# Patient Record
Sex: FEMALE | Race: WHITE | NOT HISPANIC OR LATINO | Employment: FULL TIME | URBAN - METROPOLITAN AREA
[De-identification: names, ages, dates, MRNs, and addresses within clinical notes are randomized per-mention and may not be internally consistent; named-entity substitution may affect disease eponyms.]

---

## 2017-04-04 ENCOUNTER — HOSPITAL ENCOUNTER (EMERGENCY)
Facility: MEDICAL CENTER | Age: 32
End: 2017-04-04
Attending: EMERGENCY MEDICINE
Payer: MEDICAID

## 2017-04-04 VITALS
BODY MASS INDEX: 25.07 KG/M2 | HEIGHT: 59 IN | WEIGHT: 124.34 LBS | HEART RATE: 106 BPM | TEMPERATURE: 98.1 F | RESPIRATION RATE: 23 BRPM | OXYGEN SATURATION: 95 % | DIASTOLIC BLOOD PRESSURE: 80 MMHG | SYSTOLIC BLOOD PRESSURE: 116 MMHG

## 2017-04-04 DIAGNOSIS — F10.939 ALCOHOL WITHDRAWAL, WITH UNSPECIFIED COMPLICATION (HCC): ICD-10-CM

## 2017-04-04 LAB
ALBUMIN SERPL BCP-MCNC: 3.9 G/DL (ref 3.2–4.9)
ALBUMIN/GLOB SERPL: 1.3 G/DL
ALP SERPL-CCNC: 86 U/L (ref 30–99)
ALT SERPL-CCNC: 21 U/L (ref 2–50)
AMPHETAMINES UR QL: NEGATIVE
ANION GAP SERPL CALC-SCNC: 8 MMOL/L (ref 0–11.9)
AST SERPL-CCNC: 30 U/L (ref 12–45)
BARBITURATES UR QL SCN: NEGATIVE
BASOPHILS # BLD AUTO: 1.2 % (ref 0–1.8)
BASOPHILS # BLD: 0.06 K/UL (ref 0–0.12)
BENZODIAZ UR QL SCN: POSITIVE
BILIRUB SERPL-MCNC: 0.6 MG/DL (ref 0.1–1.5)
BUN SERPL-MCNC: 6 MG/DL (ref 8–22)
BZE UR QL SCN: NEGATIVE
CALCIUM SERPL-MCNC: 8.3 MG/DL (ref 8.4–10.2)
CHLORIDE SERPL-SCNC: 100 MMOL/L (ref 96–112)
CO2 SERPL-SCNC: 26 MMOL/L (ref 20–33)
CREAT SERPL-MCNC: 0.55 MG/DL (ref 0.5–1.4)
EOSINOPHIL # BLD AUTO: 0.05 K/UL (ref 0–0.51)
EOSINOPHIL NFR BLD: 1 % (ref 0–6.9)
ERYTHROCYTE [DISTWIDTH] IN BLOOD BY AUTOMATED COUNT: 43.7 FL (ref 35.9–50)
ETHANOL BLD-MCNC: 0.18 G/DL
GFR SERPL CREATININE-BSD FRML MDRD: >60 ML/MIN/1.73 M 2
GLOBULIN SER CALC-MCNC: 3.1 G/DL (ref 1.9–3.5)
GLUCOSE SERPL-MCNC: 107 MG/DL (ref 65–99)
HCG SERPL QL: NEGATIVE
HCT VFR BLD AUTO: 37.7 % (ref 37–47)
HGB BLD-MCNC: 12.6 G/DL (ref 12–16)
IMM GRANULOCYTES # BLD AUTO: 0.02 K/UL (ref 0–0.11)
IMM GRANULOCYTES NFR BLD AUTO: 0.4 % (ref 0–0.9)
LYMPHOCYTES # BLD AUTO: 1.66 K/UL (ref 1–4.8)
LYMPHOCYTES NFR BLD: 33.3 % (ref 22–41)
MCH RBC QN AUTO: 29.5 PG (ref 27–33)
MCHC RBC AUTO-ENTMCNC: 33.4 G/DL (ref 33.6–35)
MCV RBC AUTO: 88.3 FL (ref 81.4–97.8)
MONOCYTES # BLD AUTO: 0.37 K/UL (ref 0–0.85)
MONOCYTES NFR BLD AUTO: 7.4 % (ref 0–13.4)
NEUTROPHILS # BLD AUTO: 2.82 K/UL (ref 2–7.15)
NEUTROPHILS NFR BLD: 56.7 % (ref 44–72)
NRBC # BLD AUTO: 0 K/UL
NRBC BLD AUTO-RTO: 0 /100 WBC
PCP UR QL SCN: NEGATIVE
PLATELET # BLD AUTO: 279 K/UL (ref 164–446)
PMV BLD AUTO: 8.8 FL (ref 9–12.9)
POTASSIUM SERPL-SCNC: 4.1 MMOL/L (ref 3.6–5.5)
PROT SERPL-MCNC: 7 G/DL (ref 6–8.2)
RBC # BLD AUTO: 4.27 M/UL (ref 4.2–5.4)
SODIUM SERPL-SCNC: 134 MMOL/L (ref 135–145)
UR OPIATES 2659: NEGATIVE
UR THC 2511T: NEGATIVE
UR TRICYCLIC 2660: NEGATIVE
WBC # BLD AUTO: 5 K/UL (ref 4.8–10.8)

## 2017-04-04 PROCEDURE — 96375 TX/PRO/DX INJ NEW DRUG ADDON: CPT

## 2017-04-04 PROCEDURE — 36415 COLL VENOUS BLD VENIPUNCTURE: CPT

## 2017-04-04 PROCEDURE — 84703 CHORIONIC GONADOTROPIN ASSAY: CPT

## 2017-04-04 PROCEDURE — 80307 DRUG TEST PRSMV CHEM ANLYZR: CPT

## 2017-04-04 PROCEDURE — 80053 COMPREHEN METABOLIC PANEL: CPT

## 2017-04-04 PROCEDURE — 85025 COMPLETE CBC W/AUTO DIFF WBC: CPT

## 2017-04-04 PROCEDURE — 80305 DRUG TEST PRSMV DIR OPT OBS: CPT

## 2017-04-04 PROCEDURE — 700105 HCHG RX REV CODE 258: Performed by: EMERGENCY MEDICINE

## 2017-04-04 PROCEDURE — 700111 HCHG RX REV CODE 636 W/ 250 OVERRIDE (IP): Performed by: EMERGENCY MEDICINE

## 2017-04-04 PROCEDURE — 96374 THER/PROPH/DIAG INJ IV PUSH: CPT

## 2017-04-04 PROCEDURE — 99285 EMERGENCY DEPT VISIT HI MDM: CPT

## 2017-04-04 RX ORDER — SODIUM CHLORIDE 9 MG/ML
1000 INJECTION, SOLUTION INTRAVENOUS ONCE
Status: COMPLETED | OUTPATIENT
Start: 2017-04-04 | End: 2017-04-04

## 2017-04-04 RX ORDER — ESCITALOPRAM OXALATE 20 MG/1
20 TABLET ORAL DAILY
COMMUNITY

## 2017-04-04 RX ORDER — LORAZEPAM 0.5 MG/1
.25-.5 TABLET ORAL EVERY 8 HOURS PRN
COMMUNITY

## 2017-04-04 RX ORDER — LORAZEPAM 2 MG/ML
1 INJECTION INTRAMUSCULAR ONCE
Status: COMPLETED | OUTPATIENT
Start: 2017-04-04 | End: 2017-04-04

## 2017-04-04 RX ORDER — LORAZEPAM 2 MG/ML
2 INJECTION INTRAMUSCULAR ONCE
Status: COMPLETED | OUTPATIENT
Start: 2017-04-04 | End: 2017-04-04

## 2017-04-04 RX ADMIN — LORAZEPAM 1 MG: 2 INJECTION INTRAMUSCULAR at 14:03

## 2017-04-04 RX ADMIN — SODIUM CHLORIDE 1000 ML: 9 INJECTION, SOLUTION INTRAVENOUS at 11:04

## 2017-04-04 RX ADMIN — LORAZEPAM 2 MG: 2 INJECTION INTRAMUSCULAR at 11:04

## 2017-04-04 ASSESSMENT — PAIN SCALES - WONG BAKER: WONGBAKER_NUMERICALRESPONSE: DOESN'T HURT AT ALL
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
## 2017-04-04 NOTE — ED NOTES
Report to Skyler at [facility] per request of Lora  At [phone] prior to pt departure

## 2017-04-04 NOTE — ED NOTES
Assumed care of pt-parents at bedside, placed on cardiac moniter. All aware of plan to include lifeskills eval when BA appropriate

## 2017-04-04 NOTE — ED NOTES
"Pt amb to triage w parents; c/o etoh abuse/withdrawal last drink this am. Pt c/o suicidal ideation; pt states she \"tried to drink herself to death\" x4d ago. Pt has hx etoh abuse >10yrs; hx DT's.  "

## 2017-04-04 NOTE — ED AVS SNAPSHOT
Home Care Instructions                                                                                                                Malou Lara   MRN: [id number]    Department:  [facility], Emergency Dept   Date of Visit:  4/4/2017            [facility], Emergency Dept    [address] [place] [id number]    Phone:  [phone]      You were seen by     Akbar Matos M.D.      Your Diagnosis Was     Alcohol withdrawal, with unspecified complication (CMS-HCC)     F10.239       These are the medications you received during your hospitalization from 04/04/2017 0950 to 04/04/2017 1502     Date/Time Order Dose Route Action    04/04/2017 1104 NS infusion 1,000 mL 1,000 mL Intravenous New Bag    04/04/2017 1104 lorazepam (ATIVAN) injection 2 mg 2 mg Intravenous Given    04/04/2017 1403 lorazepam (ATIVAN) injection 1 mg 1 mg Intravenous Given      Follow-up Information     1. Go to [facility].    Why:  today    Contact information    [address] [place] [address], [place] [address]  [place] [address] [phone]      Medication Information     Review all of your home medications and newly ordered medications with your primary doctor and/or pharmacist as soon as possible. Follow medication instructions as directed by your doctor and/or pharmacist.     Please keep your complete medication list with you and share with your physician. Update the information when medications are discontinued, doses are changed, or new medications (including over-the-counter products) are added; and carry medication information at all times in the event of emergency situations.               Medication List      ASK your doctor about these medications        Instructions    Morning Afternoon Evening Bedtime    LEXAPRO 20 MG tablet   Generic drug:  escitalopram        Take 20 mg by mouth every day.   Dose:  20 mg                        lorazepam 0.5 MG Tabs   Commonly known as:  ATIVAN        Take  0.25-0.5 mg by mouth every 8 hours as needed for Anxiety.   Dose:  0.25-0.5 mg                                Procedures and tests performed during your visit     CBC WITH DIFFERENTIAL    COMP METABOLIC PANEL    DIAGNOSTIC ALCOHOL (BA)    ESTIMATED GFR    HCG QUAL SERUM    UR DRUG SCREEN([place] ONLY)        Discharge Instructions       Alcohol Withdrawal  Alcohol withdrawal is a group of symptoms that can develop when a person who drinks heavily and regularly stops drinking or drinks less.  CAUSES  Heavy and regular drinking can cause chemicals that send signals from the brain to the body (neurotransmitters) to deactivate. Alcohol withdrawal develops when deactivated neurotransmitters reactivate because a person stops drinking or drinks less.  RISK FACTORS  The more a person drinks and the longer he or she drinks, the greater the risk of alcohol withdrawal. Severe withdrawal is more likely to develop in someone who:  · Had severe alcohol withdrawal in the past.  · Had a seizure during a previous episode of alcohol withdrawal.  · Is elderly.  · Is pregnant.  · Has been abusing drugs.  · Has other medical problems, including:  ¨ Infection.  ¨ Heart, lung, or liver disease.  ¨ Seizures.  ¨ Mental health problems.  SYMPTOMS  Symptoms of this condition can be mild to moderate, or they can be severe.  Mild to moderate symptoms may include:  · Fatigue.  · Nightmares.  · Trouble sleeping.  · Depression.  · Anxiety.  · Inability to think clearly.  · Mood swings.  · Irritability.  · Loss of appetite.  · Nausea or vomiting.  · Clammy skin.  · Extreme sweating.  · Rapid heartbeat.  · Shakiness.  · Uncontrollable shaking (tremor).  Severe symptoms may include:  · Fever.  · Seizures.  · Severe confusion.  · Feeling or seeing things that are not there (hallucinations).  Symptoms usually begin within eight hours after a person stops drinking or drinks less. They can last for weeks.  DIAGNOSIS  Alcohol withdrawal is diagnosed  with a medical history and physical exam. Sometimes, urine and blood tests are also done.  TREATMENT  Treatment may involve:  · Monitoring blood pressure, pulse, and breathing.  · Getting fluids through an IV tube.  · Medicine to reduce anxiety.  · Medicine to prevent or control seizures.  · Multivitamins and B vitamins.  · Having a health care provider check on you daily.  If symptoms are moderate to severe or if there is a risk of severe withdrawal, treatment may be done at a hospital or treatment center.  HOME CARE INSTRUCTIONS  · Take medicines and vitamin supplements only as directed by your health care provider.  · Do not drink alcohol.  · Have someone stay with you or be available if you need help.  · Drink enough fluid to keep your urine clear or pale yellow.  · Consider joining a 12-step program or another alcohol support group.  SEEK MEDICAL CARE IF:  · Your symptoms get worse or do not go away.  · You cannot keep food or water in your stomach.  · You are struggling with not drinking alcohol.  · You cannot stop drinking alcohol.  SEEK IMMEDIATE MEDICAL CARE IF:   · You have an irregular heartbeat.  · You have chest pain.  · You have trouble breathing.  · You have symptoms of severe withdrawal, such as:  ¨ A fever.  ¨ Seizures.  ¨ Severe confusion.  ¨ Hallucinations.     This information is not intended to replace advice given to you by your health care provider. Make sure you discuss any questions you have with your health care provider.     Document Released: 09/27/2006 Document Revised: 01/08/2016 Document Reviewed: 10/06/2015  [employer] Interactive Patient Education ©2016 [employer]            Patient Information     Patient Information    Following emergency treatment: all patient requiring follow-up care must return either to a private physician or a clinic if your condition worsens before you are able to obtain further medical attention, please return to the emergency room.     Billing  Information    At [facility], we work to make the billing process streamlined for our patients.  Our Representatives are here to answer any questions you may have regarding your hospital bill.  If you have insurance coverage and have supplied your insurance information to us, we will submit a claim to your insurer on your behalf.  Should you have any questions regarding your bill, we can be reached online or by phone as follows:  Online: You are able pay your bills online or live chat with our representatives about any billing questions you may have. We are here to help Monday - Friday from 8:00am to 7:30pm and 9:00am - 12:00pm on Saturdays.  Please visit [email or web address]  for more information.   Phone:  [phone] or [phone]    Please note that your emergency physician, surgeon, pathologist, radiologist, anesthesiologist, and other specialists are not employed by [facility] and will therefore bill separately for their services.  Please contact them directly for any questions concerning their bills at the numbers below:     Emergency Physician Services:  [phone]  [facility]:  [phone]  [facility]:  [phone]  [facility]:  [phone]    1. Your final bill may vary from the amount quoted upon discharge if all procedures are not complete at that time, or if your doctor has additional procedures of which we are not aware. You will receive an additional bill if you return to the Emergency Department at [facility] for suture removal regardless of the facility of which the sutures were placed.     2. Please arrange for settlement of this account at the emergency registration.    3. All self-pay accounts are due in full at the time of treatment.  If you are unable to meet this obligation then payment is expected within 4-5 days.     4. If you have had radiology studies (CT, X-ray, Ultrasound, MRI), you have  received a preliminary result during your emergency department visit. Please contact the radiology department [phone] to receive a copy of your final result. Please discuss the Final result with your primary physician or with the follow up physician provided.     Crisis Hotline:  [place] Crisis Hotline:  6-330-MUKECXK or 1-177.339.3880  [place] Crisis Hotline:    1-506.144.6705 or 119-048-1895         ED Discharge Follow Up Questions    1. In order to provide you with very good care, we would like to follow up with a phone call in the next few days.  May we have your permission to contact you?     YES /  NO    2. What is the best phone number to call you? (       )_____-__________    3. What is the best time to call you?      Morning  /  Afternoon  /  Evening                   Patient Signature:  ____________________________________________________________    Date:  ____________________________________________________________

## 2017-04-04 NOTE — ED NOTES
Per er charge, [facility] has accepted pt for admit , today at 1330. Update to same. Mom remains at bedside

## 2017-04-04 NOTE — ED NOTES
Pt med with ativan  Per request. Vs as charted, await [facility]

## 2017-04-04 NOTE — ED NOTES
Pt accepted by [facility]-transported to [facility] by Lora (conselor from [facility])

## 2017-04-04 NOTE — ED AVS SNAPSHOT
Nexgate Access Code: Activation code not generated  Current Nexgate Status: Active    Lomographyhart  A secure, online tool to manage your health information     [employer]’s Nexgate® is a secure, online tool that connects you to your personalized health information from the privacy of your home -- day or night - making it very easy for you to manage your healthcare. Once the activation process is completed, you can even access your medical information using the Nexgate brittani, which is available for free in the Apple Brittani store or Google Play store.     Nexgate provides the following levels of access (as shown below):   My Chart Features   [facility] Primary Care Doctor [facility]  Specialists [facility]  Urgent  Care Non-[facility]  Primary Care  Doctor   Email your healthcare team securely and privately 24/7 X X X X   Manage appointments: schedule your next appointment; view details of past/upcoming appointments X      Request prescription refills. X      View recent personal medical records, including lab and immunizations X X X X   View health record, including health history, allergies, medications X X X X   Read reports about your outpatient visits, procedures, consult and ER notes X X X X   See your discharge summary, which is a recap of your hospital and/or ER visit that includes your diagnosis, lab results, and care plan. X X       How to register for Nexgate:  1. Go to  [email or web address].  2. Click on the Sign Up Now box, which takes you to the New Member Sign Up page. You will need to provide the following information:  a. Enter your Nexgate Access Code exactly as it appears at the top of this page. (You will not need to use this code after you’ve completed the sign-up process. If you do not sign up before the expiration date, you must request a new code.)   b. Enter your date of birth.   c. Enter your home email address.   d. Click Submit, and follow the next screen’s instructions.  3. Create a Nexgate ID. This will  be your Lasso login ID and cannot be changed, so think of one that is secure and easy to remember.  4. Create a Lasso password. You can change your password at any time.  5. Enter your Password Reset Question and Answer. This can be used at a later time if you forget your password.   6. Enter your e-mail address. This allows you to receive e-mail notifications when new information is available in Lasso.  7. Click Sign Up. You can now view your health information.    For assistance activating your Lasso account, call [phone]

## 2017-04-04 NOTE — ED AVS SNAPSHOT
4/4/2017          Malou Lara  [address] [place] [address]    Dear Malou:    [facility] wants to ensure your discharge home is safe and you or your loved ones have had all your questions answered regarding your care after you leave the hospital.    You may receive a telephone call within two days of your discharge.  This call is to make certain you understand your discharge instructions as well as ensure we provided you with the best care possible during your stay with us.     The call will only last approximately 3-5 minutes and will be done by a nurse.    Once again, we want to ensure your discharge home is safe and that you have a clear understanding of any next steps in your care.  If you have any questions or concerns, please do not hesitate to contact us, we are here for you.  Thank you for choosing [facility] for your healthcare needs.    Sincerely,    Kevyn Santamaria    [facility]

## 2017-04-04 NOTE — DISCHARGE INSTRUCTIONS
Alcohol Withdrawal  Alcohol withdrawal is a group of symptoms that can develop when a person who drinks heavily and regularly stops drinking or drinks less.  CAUSES  Heavy and regular drinking can cause chemicals that send signals from the brain to the body (neurotransmitters) to deactivate. Alcohol withdrawal develops when deactivated neurotransmitters reactivate because a person stops drinking or drinks less.  RISK FACTORS  The more a person drinks and the longer he or she drinks, the greater the risk of alcohol withdrawal. Severe withdrawal is more likely to develop in someone who:  · Had severe alcohol withdrawal in the past.  · Had a seizure during a previous episode of alcohol withdrawal.  · Is elderly.  · Is pregnant.  · Has been abusing drugs.  · Has other medical problems, including:  ¨ Infection.  ¨ Heart, lung, or liver disease.  ¨ Seizures.  ¨ Mental health problems.  SYMPTOMS  Symptoms of this condition can be mild to moderate, or they can be severe.  Mild to moderate symptoms may include:  · Fatigue.  · Nightmares.  · Trouble sleeping.  · Depression.  · Anxiety.  · Inability to think clearly.  · Mood swings.  · Irritability.  · Loss of appetite.  · Nausea or vomiting.  · Clammy skin.  · Extreme sweating.  · Rapid heartbeat.  · Shakiness.  · Uncontrollable shaking (tremor).  Severe symptoms may include:  · Fever.  · Seizures.  · Severe confusion.  · Feeling or seeing things that are not there (hallucinations).  Symptoms usually begin within eight hours after a person stops drinking or drinks less. They can last for weeks.  DIAGNOSIS  Alcohol withdrawal is diagnosed with a medical history and physical exam. Sometimes, urine and blood tests are also done.  TREATMENT  Treatment may involve:  · Monitoring blood pressure, pulse, and breathing.  · Getting fluids through an IV tube.  · Medicine to reduce anxiety.  · Medicine to prevent or control seizures.  · Multivitamins and B vitamins.  · Having a health  care provider check on you daily.  If symptoms are moderate to severe or if there is a risk of severe withdrawal, treatment may be done at a hospital or treatment center.  HOME CARE INSTRUCTIONS  · Take medicines and vitamin supplements only as directed by your health care provider.  · Do not drink alcohol.  · Have someone stay with you or be available if you need help.  · Drink enough fluid to keep your urine clear or pale yellow.  · Consider joining a 12-step program or another alcohol support group.  SEEK MEDICAL CARE IF:  · Your symptoms get worse or do not go away.  · You cannot keep food or water in your stomach.  · You are struggling with not drinking alcohol.  · You cannot stop drinking alcohol.  SEEK IMMEDIATE MEDICAL CARE IF:   · You have an irregular heartbeat.  · You have chest pain.  · You have trouble breathing.  · You have symptoms of severe withdrawal, such as:  ¨ A fever.  ¨ Seizures.  ¨ Severe confusion.  ¨ Hallucinations.     This information is not intended to replace advice given to you by your health care provider. Make sure you discuss any questions you have with your health care provider.     Document Released: 09/27/2006 Document Revised: 01/08/2016 Document Reviewed: 10/06/2015  [employer] Interactive Patient Education ©2016 [employer]

## 2017-04-24 ENCOUNTER — HOSPITAL ENCOUNTER (OUTPATIENT)
Dept: LAB | Facility: MEDICAL CENTER | Age: 32
End: 2017-04-24
Attending: INTERNAL MEDICINE
Payer: MEDICAID

## 2017-04-24 ENCOUNTER — HOSPITAL ENCOUNTER (OUTPATIENT)
Dept: LAB | Facility: MEDICAL CENTER | Age: 32
End: 2017-04-24
Attending: NURSE PRACTITIONER
Payer: MEDICAID

## 2017-04-24 DIAGNOSIS — R20.2 TINGLING IN EXTREMITIES: ICD-10-CM

## 2017-04-24 LAB
ALBUMIN SERPL BCP-MCNC: 4.4 G/DL (ref 3.2–4.9)
ALBUMIN/GLOB SERPL: 1.6 G/DL
ALP SERPL-CCNC: 59 U/L (ref 30–99)
ALT SERPL-CCNC: 15 U/L (ref 2–50)
ANION GAP SERPL CALC-SCNC: 7 MMOL/L (ref 0–11.9)
AST SERPL-CCNC: 16 U/L (ref 12–45)
BASOPHILS # BLD AUTO: 1 % (ref 0–1.8)
BASOPHILS # BLD: 0.08 K/UL (ref 0–0.12)
BILIRUB SERPL-MCNC: 0.3 MG/DL (ref 0.1–1.5)
BUN SERPL-MCNC: 6 MG/DL (ref 8–22)
CALCIUM SERPL-MCNC: 9.6 MG/DL (ref 8.5–10.5)
CHLORIDE SERPL-SCNC: 105 MMOL/L (ref 96–112)
CHOLEST SERPL-MCNC: 154 MG/DL (ref 100–199)
CO2 SERPL-SCNC: 24 MMOL/L (ref 20–33)
CREAT SERPL-MCNC: 0.57 MG/DL (ref 0.5–1.4)
EOSINOPHIL # BLD AUTO: 0.05 K/UL (ref 0–0.51)
EOSINOPHIL NFR BLD: 0.7 % (ref 0–6.9)
ERYTHROCYTE [DISTWIDTH] IN BLOOD BY AUTOMATED COUNT: 44.4 FL (ref 35.9–50)
GFR SERPL CREATININE-BSD FRML MDRD: >60 ML/MIN/1.73 M 2
GLOBULIN SER CALC-MCNC: 2.7 G/DL (ref 1.9–3.5)
GLUCOSE SERPL-MCNC: 93 MG/DL (ref 65–99)
HCT VFR BLD AUTO: 37.3 % (ref 37–47)
HDLC SERPL-MCNC: 51 MG/DL
HGB BLD-MCNC: 12.2 G/DL (ref 12–16)
IMM GRANULOCYTES # BLD AUTO: 0.02 K/UL (ref 0–0.11)
IMM GRANULOCYTES NFR BLD AUTO: 0.3 % (ref 0–0.9)
LDLC SERPL CALC-MCNC: 93 MG/DL
LYMPHOCYTES # BLD AUTO: 1.68 K/UL (ref 1–4.8)
LYMPHOCYTES NFR BLD: 22 % (ref 22–41)
MCH RBC QN AUTO: 29.8 PG (ref 27–33)
MCHC RBC AUTO-ENTMCNC: 32.7 G/DL (ref 33.6–35)
MCV RBC AUTO: 91.2 FL (ref 81.4–97.8)
MONOCYTES # BLD AUTO: 0.52 K/UL (ref 0–0.85)
MONOCYTES NFR BLD AUTO: 6.8 % (ref 0–13.4)
NEUTROPHILS # BLD AUTO: 5.28 K/UL (ref 2–7.15)
NEUTROPHILS NFR BLD: 69.2 % (ref 44–72)
NRBC # BLD AUTO: 0 K/UL
NRBC BLD AUTO-RTO: 0 /100 WBC
PLATELET # BLD AUTO: 416 K/UL (ref 164–446)
PMV BLD AUTO: 9.1 FL (ref 9–12.9)
POTASSIUM SERPL-SCNC: 4.4 MMOL/L (ref 3.6–5.5)
PROT SERPL-MCNC: 7.1 G/DL (ref 6–8.2)
RBC # BLD AUTO: 4.09 M/UL (ref 4.2–5.4)
SODIUM SERPL-SCNC: 136 MMOL/L (ref 135–145)
TRIGL SERPL-MCNC: 50 MG/DL (ref 0–149)
TSH SERPL DL<=0.005 MIU/L-ACNC: 0.88 UIU/ML (ref 0.3–3.7)
VIT B12 SERPL-MCNC: 93 PG/ML (ref 211–911)
WBC # BLD AUTO: 7.6 K/UL (ref 4.8–10.8)

## 2017-04-24 PROCEDURE — 80061 LIPID PANEL: CPT

## 2017-04-24 PROCEDURE — 85025 COMPLETE CBC W/AUTO DIFF WBC: CPT

## 2017-04-24 PROCEDURE — 82607 VITAMIN B-12: CPT

## 2017-04-24 PROCEDURE — 36415 COLL VENOUS BLD VENIPUNCTURE: CPT

## 2017-04-24 PROCEDURE — 80053 COMPREHEN METABOLIC PANEL: CPT

## 2017-04-24 PROCEDURE — 84443 ASSAY THYROID STIM HORMONE: CPT

## 2017-05-31 ENCOUNTER — NON-PROVIDER VISIT (OUTPATIENT)
Dept: OBGYN | Facility: CLINIC | Age: 32
End: 2017-05-31
Payer: MEDICAID

## 2017-05-31 ENCOUNTER — INITIAL PRENATAL (OUTPATIENT)
Dept: OBGYN | Facility: CLINIC | Age: 32
End: 2017-05-31
Payer: MEDICAID

## 2017-05-31 VITALS
DIASTOLIC BLOOD PRESSURE: 70 MMHG | BODY MASS INDEX: 24.8 KG/M2 | HEIGHT: 59 IN | SYSTOLIC BLOOD PRESSURE: 102 MMHG | WEIGHT: 123 LBS

## 2017-05-31 DIAGNOSIS — Z32.01 PREGNANCY EXAMINATION OR TEST, POSITIVE RESULT: ICD-10-CM

## 2017-05-31 DIAGNOSIS — N93.8 DUB (DYSFUNCTIONAL UTERINE BLEEDING): ICD-10-CM

## 2017-05-31 LAB
IN CLINIC OB SCAN: NORMAL
INT CON NEG: NEGATIVE
INT CON POS: POSITIVE
POC URINE PREGNANCY TEST: POSITIVE

## 2017-05-31 PROCEDURE — 81025 URINE PREGNANCY TEST: CPT | Performed by: OBSTETRICS & GYNECOLOGY

## 2017-05-31 PROCEDURE — 76830 TRANSVAGINAL US NON-OB: CPT | Performed by: OBSTETRICS & GYNECOLOGY

## 2017-05-31 PROCEDURE — 99203 OFFICE O/P NEW LOW 30 MIN: CPT | Mod: 25 | Performed by: OBSTETRICS & GYNECOLOGY

## 2017-05-31 NOTE — PROGRESS NOTES
"CC: Confirmation of Pregnancy    HPI: Pt is a 32 yo  lmp 3/25/17 (approximate)  who presents for evaluation of amenorrhea.  She has had no bleeding since her last menses.  A urine pregnancy test was positive.  She denies vaginal spotting or pain.  She notes nausea and vomiting.      ROS: negative for dizziness, SOB, chest pain, palpitations, dysuria, vaginal discharge.    Blood pressure 102/70, height 1.499 m (4' 11\"), weight 55.792 kg (123 lb), last menstrual period 2017.    GENERAL: Alert, in no apparent distress  PSYCHIATRIC: Appropriate affect, intact insight and judgement.  ABDOMEN: Soft, nontender, nondistended.  No palpable masses. No hepatosplenomegaly.   No rebound or guarding.  No inguinal lymphadenopathy.  BACK: No CVA tenderness  EXTREMITIES: No edema, no calf tenderness.    GENITOURINARY:  Normal external genitalia, no lesions.  Normal urethral meatus, no masses or tenderness.  Normal bladder without fullness or masses.  Vagina well estrogenized, no vaginal discharge or lesions.  Cervix normal length, nontender.  Uterus 10 wks, normal shape and contour, nontender.  Adnexa nontender, no masses.  Normal anus and perineum.      TRANSVAGINAL ULTRASOUND - performed and interpreted by me    Single gestational sac visualized.  Yolk sac visualized.   Gates intrauterine pregnancy with CRL measuring 4.82 cm, c/w 11 +4/7 weeks EGA, positive fetal cardiac activity noted. EDC 17.     No fluid in cul de sac.    Ovaries and cervix appear grossly normal. Cervix measures 3.09 cm.      ASSESSMENT/PLAN:  1. DUB visit - Gates IUP at 11 + 4/7 wks.  Prenatal Vitamins.  F/U for NOB appointment 2 wks.   2. Hx of cone bx age 16 - cervical length normal today.  Recommend cervical length at 16 wks.  3. Anxiety - off Lexapro.  Recommended d/c of Ativan.  Pt has psychiatrist that she follows up with.  "

## 2017-05-31 NOTE — PROGRESS NOTES
today. LMP unknown   On PNV  Phone # [phone]  Pharmacy verified with patient  C/o occasional nausea

## 2017-05-31 NOTE — MR AVS SNAPSHOT
"        Malou Lara   2017 8:30 AM   Initial Prenatal   MRN: [id number]    [facility]   Dept Phone:  [phone]    Description:  Female : 1985   Provider:  Sera Rousseau M.D.           Allergies as of 2017     Allergen Noted Reactions    Pertussis Vaccine 2016   Anaphylaxis    Avocado 2016       Pumpkin Seed 2016         Vital Signs     Blood Pressure Height Weight Body Mass Index Last Menstrual Period Smoking Status    102/70 mmHg 1.499 m (4' 11\") 55.792 kg (123 lb) 24.83 kg/m2 2017 Former Smoker      Basic Information     Date Of Birth Sex Race Ethnicity Preferred Language    1985 Female White Non- English      Problem List              ICD-10-CM Priority Class Noted - Resolved    Clavicular fracture, closed, acromial end S42.033A   3/9/2016 - Present    Cardiomyopathy (CMS-HCC) I42.9   2016 - Present    Tingling in extremities R20.2   2016 - Present      Health Maintenance        Date Due Completion Dates    IMM DTaP/Tdap/Td Vaccine (1 - Tdap) 2004 ---    IMM PNEUMOCOCCAL 19-64 (ADULT) MEDIUM RISK SERIES (1 of 1 - PPSV23) 2004 ---    PAP SMEAR 2006 ---            Current Immunizations     No immunizations on file.      Below and/or attached are the medications your provider expects you to take. Review all of your home medications and newly ordered medications with your provider and/or pharmacist. Follow medication instructions as directed by your provider and/or pharmacist. Please keep your medication list with you and share with your provider. Update the information when medications are discontinued, doses are changed, or new medications (including over-the-counter products) are added; and carry medication information at all times in the event of emergency situations     Allergies:  PERTUSSIS VACCINE - Anaphylaxis     AVOCADO - (reactions not documented)     PUMPKIN SEED - (reactions not documented)            "      Medications  Valid as of: May 31, 2017 -  9:05 AM    Generic Name Brand Name Tablet Size Instructions for use    Escitalopram Oxalate (Tab) LEXAPRO 20 MG Take 20 mg by mouth every day.        LORazepam (Tab) ATIVAN 0.5 MG Take 0.25-0.5 mg by mouth every 8 hours as needed for Anxiety.        Prenatal Vit w/ Fe Bisg-FA   Take  by mouth.        .                 Medicines prescribed today were sent to:     [facility] [address] [place] [address] - [id number] [facility] [place] [address]    Phone: [phone] Fax: [phone]    Open 24 Hours?: No      Medication refill instructions:       If your prescription bottle indicates you have medication refills left, it is not necessary to call your provider’s office. Please contact your pharmacy and they will refill your medication.    If your prescription bottle indicates you do not have any refills left, you may request refills at any time through one of the following ways: The online [employer] system (except Urgent Care), by calling your provider’s office, or by asking your pharmacy to contact your provider’s office with a refill request. Medication refills are processed only during regular business hours and may not be available until the next business day. Your provider may request additional information or to have a follow-up visit with you prior to refilling your medication.   *Please Note: Medication refills are assigned a new Rx number when refilled electronically. Your pharmacy may indicate that no refills were authorized even though a new prescription for the same medication is available at the pharmacy. Please request the medicine by name with the pharmacy before contacting your provider for a refill.           [employer] Access Code: Activation code not generated  Current Go [employer] P[employer] Status: Active

## 2017-07-25 ENCOUNTER — INITIAL PRENATAL (OUTPATIENT)
Dept: OBGYN | Facility: CLINIC | Age: 32
End: 2017-07-25
Payer: MEDICAID

## 2017-07-25 ENCOUNTER — HOSPITAL ENCOUNTER (OUTPATIENT)
Facility: MEDICAL CENTER | Age: 32
End: 2017-07-25
Attending: NURSE PRACTITIONER
Payer: MEDICAID

## 2017-07-25 VITALS — WEIGHT: 127 LBS | SYSTOLIC BLOOD PRESSURE: 106 MMHG | BODY MASS INDEX: 25.64 KG/M2 | DIASTOLIC BLOOD PRESSURE: 62 MMHG

## 2017-07-25 DIAGNOSIS — I42.6 ALCOHOLIC CARDIOMYOPATHY (HCC): ICD-10-CM

## 2017-07-25 DIAGNOSIS — Z34.00 SUPERVISION OF NORMAL FIRST PREGNANCY, ANTEPARTUM: ICD-10-CM

## 2017-07-25 DIAGNOSIS — Z98.890 H/O CONE BIOPSY OF CERVIX: ICD-10-CM

## 2017-07-25 DIAGNOSIS — F10.10: ICD-10-CM

## 2017-07-25 DIAGNOSIS — Z34.82 ENCOUNTER FOR SUPERVISION OF OTHER NORMAL PREGNANCY IN SECOND TRIMESTER: ICD-10-CM

## 2017-07-25 DIAGNOSIS — O99.310: ICD-10-CM

## 2017-07-25 LAB
APPEARANCE UR: NORMAL
BILIRUB UR STRIP-MCNC: NORMAL MG/DL
COLOR UR AUTO: NORMAL
GLUCOSE UR STRIP.AUTO-MCNC: NEGATIVE MG/DL
KETONES UR STRIP.AUTO-MCNC: NEGATIVE MG/DL
LEUKOCYTE ESTERASE UR QL STRIP.AUTO: NEGATIVE
NITRITE UR QL STRIP.AUTO: NEGATIVE
PH UR STRIP.AUTO: 6 [PH] (ref 5–8)
PROT UR QL STRIP: NEGATIVE MG/DL
RBC UR QL AUTO: NEGATIVE
SP GR UR STRIP.AUTO: 1
UROBILINOGEN UR STRIP-MCNC: NORMAL MG/DL

## 2017-07-25 PROCEDURE — 81002 URINALYSIS NONAUTO W/O SCOPE: CPT | Performed by: NURSE PRACTITIONER

## 2017-07-25 PROCEDURE — 59401 PR NEW OB VISIT: CPT | Performed by: NURSE PRACTITIONER

## 2017-07-25 PROCEDURE — 87591 N.GONORRHOEAE DNA AMP PROB: CPT

## 2017-07-25 PROCEDURE — 87491 CHLMYD TRACH DNA AMP PROBE: CPT

## 2017-07-25 NOTE — PROGRESS NOTES
S:  Malou Lara is a 32 y.o.  who presents for her new OB exam.  She is 19w3d with and KATHY of Estimated Date of Delivery: 17 by . Hx of alcohol abuse with withdrawal seizure. Currently in remission. Sees [facility] for psych. Hx of lexapro use, none currently. Very rare use of ativan for anxiety. She has no complaints.  She is currently not working outside the. No heavy lifting or chemical exposure.  FOB is in penitentiary for domestic violence. She states she is safe now and has very supportive family.     desires AFP.  declines CF.  Denies VB, LOF, or cramping.  Denies dysuria, vaginal DC. Reports positive fetal movement.     Pt is single and lives with boyfriend.  Pregnancy is desired.      Past Medical History   Diagnosis Date   • Arthritis      RA   • Pain 3/8/16       right shoulder chest and head  5/10     Family History   Problem Relation Age of Onset   • Cancer Maternal Grandmother      breast      Social History     Social History   • Marital Status:      Spouse Name: N/A   • Number of Children: N/A   • Years of Education: N/A     Occupational History   • Not on file.     Social History Main Topics   • Smoking status: Former Smoker -- 1.00 packs/day     Types: Cigarettes     Quit date: 2016   • Smokeless tobacco: Never Used   • Alcohol Use: No      Comment: etoh abuse   • Drug Use: No   • Sexual Activity:     Partners: Male     Birth Control/ Protection: Pill     Other Topics Concern   • Not on file     Social History Narrative     OB History    Para Term  AB SAB TAB Ectopic Multiple Living   1               # Outcome Date GA Lbr Rajendra/2nd Weight Sex Delivery Anes PTL Lv   1 Current                   History of HSV I or II in self or partner: no  History of Thyroid problems: no    O:    Filed Vitals:    17 0917   BP: 106/62   Weight: 57.607 kg (127 lb)      See Prenatal Physical.    Wet mount: none      A:   1.  IUP @ 19w3d per         2.  S=D         3.  See problem list below        4.  Alcohol abuse in remission        5.  Risk for incompetent cervix       Patient Active Problem List    Diagnosis Date Noted   • Supervision of normal first pregnancy 07/25/2017   • Alcohol abuse affecting pregnancy 07/25/2017   • H/O cone biopsy of cervix 07/25/2017   • Tingling in extremities 09/14/2016   • Cardiomyopathy (CMS-HCC) 08/23/2016   • Clavicular fracture, closed, acromial end 03/09/2016         P:  1.  GC/CT  Done. Pap done at [facility] records requested        2.  Prenatal labs ordered - lab slip given including AFP        3.  Discussed PNV, diet, avoidances and adequate water intake        4.  NOB packet given        5.  Return to office in 4 wks        6.  Complete OB US first available..         7.  Add transvaginal cervical length to ultrasound.         8.  Consider benadryl instead of ativan for prn anxiety.         9.  Continue mental health care with [facility]       No orders of the defined types were placed in this encounter.

## 2017-07-25 NOTE — Clinical Note
Cystic Fibrosis Carrier Testing  Maloujamari Lara    The following information is about a blood test that can be done to determine if you and/or your partner carry the gene for cystic fibrosis.    WHAT IS CYSTIC FIBROSIS?  · Cystic fibrosis (CF) is an inherited disease that affects more than 25,000 American children and young adults.  · Symptoms of CF vary but include lung congestion, pneumonia, diarrhea and poor growth.  Most people with CF have severe medical problems and some die at a young age.  Others have so few symptoms they are unaware they have CF.  · CF does not affect intelligence.  · Although there is no cure for CF at this time, scientists are making progress in improving treatment and in searching for a cure.  In the past many people with CF  at a very young age.  Today, many are living into their 20’s and 30’s.    IS THERE A CHANCE MY BABY COULD HAVE CYSTIC FIBROSIS?  · You can have a child with CF even if there is no history in your family (see chart below).  · CF testing can help determine if you are a carrier and at risk to have a child with CF.  Note: if both parents are carriers, there is a 1 in 4 (25%) chance with each pregnancy that they will have a child with CF.  · Carriers have one normal CF gene and one altered CF gene.  · People with CF have two altered CF genes.  · Most people have two normal copies of the CF gene.    Approximate risk that a couple with no family history of cystic fibrosis will have a child with cystic fibrosis:    Ethnic background / Risk     couple:  1 in 2,500   couple:  1 in 15,000            couple:  1 in 8,000     American couple:  1 in 32,000     WHAT TESTING IS AVAILABLE?  · There is a blood test that can be done to find out if you or your partner is a carrier.  · It is important to understand that CF carrier testing does not detect all CF carriers.  · If the test shows that you are both CF carriers, you unborn baby can  be tested to find out if the baby has CF.    HOW MUCH DOES IT COST TO HAVE CYSTIC FIBROSIS CARRIER TESTING?  · Cost and insurance coverage for CF carrier testing vary depending upon the laboratory used and your insurance policy.  · The average cost for CF carrier testing is $780 per person.  · Your genetic counselor can provide you with more information about cystic fibrosis carrier testing.    _____  Yes, I am interested in discussing carrier testing with a genetic counselor.    _____  No, I am not interested in CF carrier testing or in receiving more information about CF carrier testing.      Client signature: ________________________________________  7/25/2017

## 2017-07-25 NOTE — MR AVS SNAPSHOT
Malou Barton Lara   2017 9:30 AM   Initial Prenatal   MRN: [id number]    [facility]   Dept Phone:  [phone]    Description:  Female : 1985   Provider:  Delilah Almendarez D.N.P.; [facility] INTAKE           Allergies as of 2017     Allergen Noted Reactions    Pertussis Vaccine 2016   Anaphylaxis    Avocado 2016       Pumpkin Seed 2016         You were diagnosed with     Encounter for supervision of other normal pregnancy in second trimester   [2400682]       Supervision of normal first pregnancy, antepartum   [1532569]       Alcoholic cardiomyopathy (CMS-HCC)   [425.5.ICD-9-CM]       Alcohol abuse affecting pregnancy   [9792390]       H/O cone biopsy of cervix   [933042]         Vital Signs     Blood Pressure Weight Last Menstrual Period Smoking Status          106/62 mmHg 57.607 kg (127 lb) 2017 Former Smoker        Basic Information     Date Of Birth Sex Race Ethnicity Preferred Language    1985 Female White Non- English      Your appointments     Aug 28, 2017  1:00 PM   OB Follow Up with GALI LucasP.   [facility] [address] [id number] [phone]              Problem List              ICD-10-CM Priority Class Noted - Resolved    Clavicular fracture, closed, acromial end S42.033A   3/9/2016 - Present    Cardiomyopathy (CMS-HCC) I42.9   2016 - Present    Tingling in extremities R20.2   2016 - Present    Supervision of normal first pregnancy Z34.00   2017 - Present    Alcohol abuse affecting pregnancy O99.310, F10.10   2017 - Present    H/O cone biopsy of cervix Z98.890   2017 - Present      Health Maintenance        Date Due Completion Dates    IMM DTaP/Tdap/Td Vaccine (1 - Tdap) 2004 ---    IMM PNEUMOCOCCAL 19-64 (ADULT) MEDIUM RISK SERIES (1 of 1 - PPSV23) 2004 ---    PAP SMEAR 2006 ---    IMM INFLUENZA (1) 2017 ---            Current  Immunizations     No immunizations on file.      Below and/or attached are the medications your provider expects you to take. Review all of your home medications and newly ordered medications with your provider and/or pharmacist. Follow medication instructions as directed by your provider and/or pharmacist. Please keep your medication list with you and share with your provider. Update the information when medications are discontinued, doses are changed, or new medications (including over-the-counter products) are added; and carry medication information at all times in the event of emergency situations     Allergies:  PERTUSSIS VACCINE - Anaphylaxis     AVOCADO - (reactions not documented)     PUMPKIN SEED - (reactions not documented)               Medications  Valid as of: July 25, 2017 -  9:56 AM    Generic Name Brand Name Tablet Size Instructions for use    Escitalopram Oxalate (Tab) LEXAPRO 20 MG Take 20 mg by mouth every day.        LORazepam (Tab) ATIVAN 0.5 MG Take 0.25-0.5 mg by mouth every 8 hours as needed for Anxiety.        Prenatal MV-Min-Fe Fum-FA-DHA   Take  by mouth.        Prenatal Vit w/ Fe Bisg-FA   Take  by mouth.        .                 Medicines prescribed today were sent to:     [facility] [address] [place] [address]    Phone: [phone] Fax: [phone]    Open 24 Hours?: No      Medication refill instructions:       If your prescription bottle indicates you have medication refills left, it is not necessary to call your provider’s office. Please contact your pharmacy and they will refill your medication.    If your prescription bottle indicates you do not have any refills left, you may request refills at any time through one of the following ways: The online [employer] system (except Urgent Care), by calling your provider’s office, or by asking your pharmacy to contact your provider’s office with a refill request. Medication  refills are processed only during regular business hours and may not be available until the next business day. Your provider may request additional information or to have a follow-up visit with you prior to refilling your medication.   *Please Note: Medication refills are assigned a new Rx number when refilled electronically. Your pharmacy may indicate that no refills were authorized even though a new prescription for the same medication is available at the pharmacy. Please request the medicine by name with the pharmacy before contacting your provider for a refill.        Your To Do List     Future Labs/Procedures Complete By Expires    CHLAMYDIA/GC PCR URINE OR SWAB  As directed 7/26/2018    PREG CNTR PRENATAL PN  As directed 7/26/2018    US-OB 2ND 3RD TRI COMPLETE  As directed 7/25/2018         MyChart Access Code: Activation code not generated  Current My[employer] Status: Active

## 2017-07-26 LAB
C TRACH DNA SPEC QL NAA+PROBE: NEGATIVE
N GONORRHOEA DNA SPEC QL NAA+PROBE: NEGATIVE
SPECIMEN SOURCE: NORMAL

## 2017-08-04 ENCOUNTER — HOSPITAL ENCOUNTER (OUTPATIENT)
Dept: LAB | Facility: MEDICAL CENTER | Age: 32
End: 2017-08-04
Attending: NURSE PRACTITIONER
Payer: MEDICAID

## 2017-08-04 ENCOUNTER — APPOINTMENT (OUTPATIENT)
Dept: RADIOLOGY | Facility: IMAGING CENTER | Age: 32
End: 2017-08-04
Attending: NURSE PRACTITIONER
Payer: MEDICAID

## 2017-08-04 DIAGNOSIS — Z34.82 ENCOUNTER FOR SUPERVISION OF OTHER NORMAL PREGNANCY IN SECOND TRIMESTER: ICD-10-CM

## 2017-08-04 PROCEDURE — 86850 RBC ANTIBODY SCREEN: CPT

## 2017-08-04 PROCEDURE — 36415 COLL VENOUS BLD VENIPUNCTURE: CPT

## 2017-08-04 PROCEDURE — 81511 FTL CGEN ABNOR FOUR ANAL: CPT

## 2017-08-04 PROCEDURE — 86780 TREPONEMA PALLIDUM: CPT

## 2017-08-04 PROCEDURE — 85025 COMPLETE CBC W/AUTO DIFF WBC: CPT

## 2017-08-04 PROCEDURE — 87389 HIV-1 AG W/HIV-1&-2 AB AG IA: CPT

## 2017-08-04 PROCEDURE — 81003 URINALYSIS AUTO W/O SCOPE: CPT

## 2017-08-04 PROCEDURE — 86900 BLOOD TYPING SEROLOGIC ABO: CPT

## 2017-08-04 PROCEDURE — 87340 HEPATITIS B SURFACE AG IA: CPT

## 2017-08-04 PROCEDURE — 86762 RUBELLA ANTIBODY: CPT

## 2017-08-04 PROCEDURE — 76805 OB US >/= 14 WKS SNGL FETUS: CPT | Performed by: OBSTETRICS & GYNECOLOGY

## 2017-08-04 PROCEDURE — 86901 BLOOD TYPING SEROLOGIC RH(D): CPT

## 2017-08-05 ENCOUNTER — DATING (OUTPATIENT)
Dept: OBGYN | Facility: CLINIC | Age: 32
End: 2017-08-05

## 2017-08-05 DIAGNOSIS — O44.02 PLACENTA PREVIA, SECOND TRIMESTER: ICD-10-CM

## 2017-08-05 LAB
ABO GROUP BLD: NORMAL
APPEARANCE UR: CLEAR
BASOPHILS # BLD AUTO: 0.4 % (ref 0–1.8)
BASOPHILS # BLD: 0.04 K/UL (ref 0–0.12)
BILIRUB UR QL STRIP.AUTO: NEGATIVE
BLD GP AB SCN SERPL QL: NORMAL
COLOR UR: YELLOW
CULTURE IF INDICATED INDCX: NO UA CULTURE
EOSINOPHIL # BLD AUTO: 0.04 K/UL (ref 0–0.51)
EOSINOPHIL NFR BLD: 0.4 % (ref 0–6.9)
ERYTHROCYTE [DISTWIDTH] IN BLOOD BY AUTOMATED COUNT: 44.3 FL (ref 35.9–50)
GLUCOSE UR STRIP.AUTO-MCNC: NEGATIVE MG/DL
HBV SURFACE AG SER QL: NEGATIVE
HCT VFR BLD AUTO: 36.1 % (ref 37–47)
HGB BLD-MCNC: 11.6 G/DL (ref 12–16)
HIV 1+2 AB+HIV1 P24 AG SERPL QL IA: NON REACTIVE
IMM GRANULOCYTES # BLD AUTO: 0.03 K/UL (ref 0–0.11)
IMM GRANULOCYTES NFR BLD AUTO: 0.3 % (ref 0–0.9)
KETONES UR STRIP.AUTO-MCNC: 100 MG/DL
LEUKOCYTE ESTERASE UR QL STRIP.AUTO: NEGATIVE
LYMPHOCYTES # BLD AUTO: 1.59 K/UL (ref 1–4.8)
LYMPHOCYTES NFR BLD: 16.5 % (ref 22–41)
MCH RBC QN AUTO: 29.2 PG (ref 27–33)
MCHC RBC AUTO-ENTMCNC: 32.1 G/DL (ref 33.6–35)
MCV RBC AUTO: 90.9 FL (ref 81.4–97.8)
MICRO URNS: ABNORMAL
MONOCYTES # BLD AUTO: 0.51 K/UL (ref 0–0.85)
MONOCYTES NFR BLD AUTO: 5.3 % (ref 0–13.4)
NEUTROPHILS # BLD AUTO: 7.44 K/UL (ref 2–7.15)
NEUTROPHILS NFR BLD: 77.1 % (ref 44–72)
NITRITE UR QL STRIP.AUTO: NEGATIVE
NRBC # BLD AUTO: 0 K/UL
NRBC BLD AUTO-RTO: 0 /100 WBC
PH UR STRIP.AUTO: 7 [PH]
PLATELET # BLD AUTO: 448 K/UL (ref 164–446)
PMV BLD AUTO: 9.9 FL (ref 9–12.9)
PROT UR QL STRIP: NEGATIVE MG/DL
RBC # BLD AUTO: 3.97 M/UL (ref 4.2–5.4)
RBC UR QL AUTO: NEGATIVE
RH BLD: NORMAL
RUBV AB SER QL: 99.7 IU/ML
SP GR UR STRIP.AUTO: 1
TREPONEMA PALLIDUM IGG+IGM AB [PRESENCE] IN SERUM OR PLASMA BY IMMUNOASSAY: NON REACTIVE
UROBILINOGEN UR STRIP.AUTO-MCNC: NEGATIVE MG/DL
WBC # BLD AUTO: 9.7 K/UL (ref 4.8–10.8)

## 2017-08-07 LAB
# FETUSES US: NORMAL
AFP MOM SERPL: 0.94
AFP SERPL-MCNC: 68 NG/ML
AGE - REPORTED: 32.5 YR
GA METHOD: NORMAL
GA: 20.86 WEEKS
HCG MOM SERPL: 0.86
HCG SERPL-ACNC: NORMAL IU/L
IDDM PATIENT QL: NO
INHIBIN A MOM SERPL: 0.75
INHIBIN A SERPL-MCNC: 155 PG/ML
INTEGRATED SCN PATIENT-IMP: NORMAL
PATHOLOGY STUDY: NORMAL
U ESTRIOL MOM SERPL: 0.73
U ESTRIOL SERPL-MCNC: 1.87 NG/ML